# Patient Record
Sex: MALE | Race: BLACK OR AFRICAN AMERICAN | Employment: FULL TIME | URBAN - NONMETROPOLITAN AREA
[De-identification: names, ages, dates, MRNs, and addresses within clinical notes are randomized per-mention and may not be internally consistent; named-entity substitution may affect disease eponyms.]

---

## 2020-10-29 ENCOUNTER — HOSPITAL ENCOUNTER (EMERGENCY)
Age: 65
Discharge: HOME | End: 2020-10-29
Attending: EMERGENCY MEDICINE
Payer: COMMERCIAL

## 2020-10-29 VITALS
RESPIRATION RATE: 18 BRPM | BODY MASS INDEX: 29.16 KG/M2 | WEIGHT: 220 LBS | SYSTOLIC BLOOD PRESSURE: 131 MMHG | OXYGEN SATURATION: 99 % | HEIGHT: 73 IN | TEMPERATURE: 98 F | HEART RATE: 92 BPM | DIASTOLIC BLOOD PRESSURE: 77 MMHG

## 2020-10-29 DIAGNOSIS — R33.9 URINARY RETENTION: Primary | ICD-10-CM

## 2020-10-29 PROCEDURE — 99283 EMERGENCY DEPT VISIT LOW MDM: CPT

## 2020-10-29 PROCEDURE — 51702 INSERT TEMP BLADDER CATH: CPT

## 2020-10-29 RX ORDER — TAMSULOSIN HYDROCHLORIDE 0.4 MG/1
0.4 CAPSULE ORAL DAILY
COMMUNITY

## 2020-10-29 NOTE — ED TRIAGE NOTES
Patient presents to ED reporting urinary retention. Patient states he had a vital placed 1 week ago for acute urinary retention and his urologist discontinued the catheter at 11am this morning. Patient reports he has been unable to urinate since. Patient reporting severe lower abd pain. Denies all other complaints at this time.

## 2020-10-30 NOTE — ED PROVIDER NOTES
EMERGENCY DEPARTMENT HISTORY AND PHYSICAL EXAM  ?    Date: 10/29/2020  Patient Name: Afia Paz    History of Presenting Illness    Patient presents with:  Urinary Retention      History Provided By: Patient    HPI: Afia Paz, 77 y.o. male with a past medical history significant for benign prostatic hypertrophy presents to the ED with cc of lower abdominal pressure due to urinary retention. He had a vital catheter removed 9 hours ago and has not been able to urinate at all. Pain is severe and localized to the lower abdomen. There are no other complaints, changes, or physical findings at this time. PCP: No primary care provider on file. Current Outpatient Medications:  tamsulosin (Flomax) 0.4 mg capsule, Take 0.4 mg by mouth daily. , Disp: , Rfl:         Past History    Past Medical History:  Past Medical History:  No date: Ill-defined condition     Comment:  enlarged prostate    Past Surgical History:  History reviewed. No pertinent surgical history. Family History:  History reviewed. No pertinent family history. Social History:  Social History   Tobacco Use     Smoking status: Never Smoker     Smokeless tobacco: Never Used   Alcohol use: Not Currently   Drug use: Never      Allergies:  No Known Allergies      Review of Systems  @[facility]@    Physical Exam  @[facility]@    Diagnostic Study Results    Labs -  No results found for this or any previous visit (from the past 15 hour(s)). Radiologic Studies -   No orders to display  CT Results  (Last 48 hours)   None     CXR Results  (Last 48 hours)   None       Medical Decision Making and ED Course  I am the first provider for this patient. I reviewed the vital signs, available nursing notes, past medical history, past surgical history, family history and social history. Vital Signs-Reviewed the patient's vital signs. Empty flowsheet group.         Records Reviewed: Nursing Notes    Provider Notes (Medical Decision Making): Patient has urinary retention after removal of indwelling Ny catheter. Low suspicion for infection. The patient presents with urinary retention, UTI. ED Course:   Patient reports patient of abdominal discomfort with catheterization. Ny drained 1600 mL of clear urine. Initial assessment performed. The patients presenting problems have been discussed, and they are in agreement with the care plan formulated and outlined with them. I have encouraged them to ask questions as they arise throughout their visit. Disposition    Discharged      DISCHARGE PLAN:  1. Current Discharge Medication List    CONTINUE these medications which have NOT CHANGED    tamsulosin (Flomax) 0.4 mg capsule  Take 0.4 mg by mouth daily. 2. Follow-up Information    None    3. Return to ED if worse     Diagnosis    Clinical Impression: Urinary retention        Dashawn Molina MD    Please note that this dictation was completed with [employer], the computer voice recognition software. Quite often unanticipated grammatical, syntax, homophones, and other interpretive errors are inadvertently transcribed by the computer software. Please disregard these errors. Please excuse any errors that have escaped final proofreading. Thank you.    ? Past Medical History:   Diagnosis Date    Ill-defined condition     enlarged prostate       History reviewed. No pertinent surgical history. History reviewed. No pertinent family history.     Social History     Socioeconomic History    Marital status: Not on file     Spouse name: Not on file    Number of children: Not on file    Years of education: Not on file    Highest education level: Not on file   Occupational History    Not on file   Social Needs    Financial resource strain: Not on file    Food insecurity     Worry: Not on file     Inability: Not on file    Transportation needs     Medical: Not on file     Non-medical: Not on file   Tobacco Use    Smoking status: Never Smoker    Smokeless tobacco: Never Used   Substance and Sexual Activity    Alcohol use: Not Currently    Drug use: Never    Sexual activity: Not Currently   Lifestyle    Physical activity     Days per week: Not on file     Minutes per session: Not on file    Stress: Not on file   Relationships    Social connections     Talks on phone: Not on file     Gets together: Not on file     Attends Alevism service: Not on file     Active member of club or organization: Not on file     Attends meetings of clubs or organizations: Not on file     Relationship status: Not on file    Intimate partner violence     Fear of current or ex partner: Not on file     Emotionally abused: Not on file     Physically abused: Not on file     Forced sexual activity: Not on file   Other Topics Concern    Not on file   Social History Narrative    Not on file         ALLERGIES: Patient has no known allergies. Review of Systems   Constitutional: Negative. HENT: Negative. Eyes: Negative. Respiratory: Negative. Cardiovascular: Negative. Gastrointestinal: Positive for abdominal pain. Endocrine: Negative. Genitourinary: Positive for difficulty urinating and urgency. Musculoskeletal: Negative. Skin: Negative. Allergic/Immunologic: Negative. Neurological: Negative. Hematological: Negative. Psychiatric/Behavioral: Negative. Vitals:    10/1955   BP: (!) 154/83   Pulse: (!) 110   Resp: 18   Temp: 98 °F (36.7 °C)   SpO2: 99%   Weight: 99.8 kg (220 lb)   Height: 6' 1\" (1.854 m)            Physical Exam  Constitutional:       General: He is in acute distress. Appearance: Normal appearance. He is not toxic-appearing. HENT:      Head: Normocephalic and atraumatic. Eyes:      Conjunctiva/sclera: Conjunctivae normal.      Pupils: Pupils are equal, round, and reactive to light. Cardiovascular:      Rate and Rhythm: Regular rhythm. Tachycardia present.    Pulmonary: Effort: Pulmonary effort is normal.      Breath sounds: Normal breath sounds. Abdominal:      General: There is distension. Tenderness: There is abdominal tenderness. Skin:     General: Skin is warm and dry. Neurological:      General: No focal deficit present. Mental Status: He is alert and oriented to person, place, and time.    Psychiatric:         Mood and Affect: Mood normal.         Behavior: Behavior normal.          MDM       Procedures

## 2020-10-30 NOTE — ED NOTES
Patient given all discharge instructions and educated about vital care at this time. Patient verbalized understanding of follow-up care and vital care. Patient departed ED ambulatory at this time.

## 2023-05-15 RX ORDER — TAMSULOSIN HYDROCHLORIDE 0.4 MG/1
0.4 CAPSULE ORAL DAILY
COMMUNITY